# Patient Record
Sex: FEMALE | Race: OTHER | HISPANIC OR LATINO
[De-identification: names, ages, dates, MRNs, and addresses within clinical notes are randomized per-mention and may not be internally consistent; named-entity substitution may affect disease eponyms.]

---

## 2020-09-18 ENCOUNTER — TRANSCRIPTION ENCOUNTER (OUTPATIENT)
Age: 60
End: 2020-09-18

## 2020-10-06 ENCOUNTER — TRANSCRIPTION ENCOUNTER (OUTPATIENT)
Age: 60
End: 2020-10-06

## 2022-11-07 ENCOUNTER — NON-APPOINTMENT (OUTPATIENT)
Age: 62
End: 2022-11-07

## 2023-02-11 ENCOUNTER — NON-APPOINTMENT (OUTPATIENT)
Age: 63
End: 2023-02-11

## 2023-06-10 ENCOUNTER — APPOINTMENT (OUTPATIENT)
Dept: PODIATRY | Facility: CLINIC | Age: 63
End: 2023-06-10

## 2023-08-29 ENCOUNTER — APPOINTMENT (OUTPATIENT)
Dept: PODIATRY | Facility: CLINIC | Age: 63
End: 2023-08-29
Payer: COMMERCIAL

## 2023-08-29 DIAGNOSIS — S90.212A CONTUSION OF LEFT GREAT TOE WITH DAMAGE TO NAIL, INITIAL ENCOUNTER: ICD-10-CM

## 2023-08-29 PROCEDURE — 99203 OFFICE O/P NEW LOW 30 MIN: CPT

## 2023-09-05 PROBLEM — S90.212A CONTUSION OF LEFT GREAT TOE WITH DAMAGE TO NAIL, INITIAL ENCOUNTER: Status: ACTIVE | Noted: 2023-08-31

## 2023-09-05 NOTE — HISTORY OF PRESENT ILLNESS
[FreeTextEntry1] : Patient presents today with 2 problems.  She states that she has had a left hallux, lateral nail purple discoloration since February.  She is not sure if she had bumped her foot but the discoloration has not changed since she first noticed it.  She is not using any nail polish currently.  Denies similar lesions on other nails.  Patient denies any pain. She also left dorsal ankle soft tissue nodule in the skin that feels hard.  It is not pain and it has not changed in appearance.  It has been present for approximately 3 years.  She has seen a dermatologist in the past and had a biopsy performed and was told that it is benign.  Patient is interested in treatment options for this.

## 2023-09-05 NOTE — ASSESSMENT
[FreeTextEntry1] : Impression: Left hallux subungual hematoma (S90.212A).  Left ankle soft tissue mass (M79.89) likely dermatofibroma.    Treatment: Discussed etiology and treatment options.  Regarding the left soft tissue mass, I advised patient to contact her prior dermatologist (she is not who it was) to get the report.  Can perform excision of the lesion; however, if it is benign and does not cause any problems in shoe gear and physical activities, can continue to monitor it.  With any changes or increase in size, would consider resection at that time.   Regarding subungual hematoma, left, will continue to monitor as the nail grows out.  Nails grow out slowly.  Advised patient to wear roomy, well-fitted shoe gear to decrease pressure on the toe.   Return: 3 - 4 months for monitoring of skin mass and hematoma.

## 2023-09-05 NOTE — PHYSICAL EXAM
[2+] : left foot dorsalis pedis 2+ [No Focal Deficits] : no focal deficits [Ankle Swelling (On Exam)] : not present [Varicose Veins Of Lower Extremities] : not present [FreeTextEntry3] : CFT: 3 seconds x 10.  Temperature gradient: normal. [de-identified] : ROM of all pedal joints intact, non-painful, non-crepitant.  No joint effusions or erythema.   [FreeTextEntry1] : Light touch intact.

## 2023-12-26 ENCOUNTER — APPOINTMENT (OUTPATIENT)
Dept: PODIATRY | Facility: CLINIC | Age: 63
End: 2023-12-26
Payer: COMMERCIAL

## 2023-12-26 DIAGNOSIS — S90.229A CONTUSION OF UNSPECIFIED LESSER TOE(S) WITH DAMAGE TO NAIL, INITIAL ENCOUNTER: ICD-10-CM

## 2023-12-26 DIAGNOSIS — M79.89 OTHER SPECIFIED SOFT TISSUE DISORDERS: ICD-10-CM

## 2023-12-26 DIAGNOSIS — S90.212D: ICD-10-CM

## 2023-12-26 PROCEDURE — 99212 OFFICE O/P EST SF 10 MIN: CPT

## 2023-12-27 PROBLEM — M79.89 MASS OF SOFT TISSUE OF ANKLE: Status: ACTIVE | Noted: 2023-08-31

## 2024-01-02 PROBLEM — S90.229A SUBUNGUAL HEMATOMA OF TOENAIL: Status: ACTIVE | Noted: 2023-12-27

## 2024-01-02 PROBLEM — S90.212D: Status: ACTIVE | Noted: 2023-12-27

## 2024-01-02 NOTE — HISTORY OF PRESENT ILLNESS
[Sneakers] : alexandra [FreeTextEntry1] : Patient returns today for follow-up of 2 problems. Patient has left hallux lateral nail discoloration that has been growing out since the last appointment. She denies any recurrence of the lesion and has been slowly cutting it off and trimming her nail. Patient also returns for left foot skin lesion that she has had biopsied by a dermatologist prior but was unable to find the name of the dermatologist. She states that the lesion has not been bothersome. It has not increased in size. She denies any drainage, ulceration or bleeding. Denies any difficulty with shoe gear. Patient denies any medical changes.

## 2024-01-02 NOTE — PHYSICAL EXAM
[2+] : left foot dorsalis pedis 2+ [No Focal Deficits] : no focal deficits [Ankle Swelling (On Exam)] : not present [Varicose Veins Of Lower Extremities] : not present [FreeTextEntry3] : CFT: 3 seconds x10. Temperature gradient: normal. [de-identified] : ROM of all pedal joints intact, non-painful, non-crepitant. No joint effusions or erythema. [FreeTextEntry1] : Light touch intact.

## 2024-01-02 NOTE — ASSESSMENT
[FreeTextEntry1] : Impression: Left hallux subungual hematoma. Left ankle soft tissue mass, likely dermatofibroma.  Treatment: Ther left hallucal nail was trimmed to hygienic length with subungual hematoma cut off. There is no residual discoloration present. No clinical suspicion for melanoma. No indication for biopsy at this time.  Regarding left foot soft tissue mass. It has not changed in size or appearance, less likely to be malignant. Patient states that she would like to have the soft tissue mass resected likely in the summer.  I discussed the procedure, anesthesia and expectations with the patient. She will consider her options and go back when she is ready for resection. Will see patient back as needed.

## 2024-07-24 ENCOUNTER — APPOINTMENT (OUTPATIENT)
Dept: PODIATRY | Facility: CLINIC | Age: 64
End: 2024-07-24

## 2024-07-24 DIAGNOSIS — M79.89 OTHER SPECIFIED SOFT TISSUE DISORDERS: ICD-10-CM

## 2024-07-24 DIAGNOSIS — S90.112S: ICD-10-CM

## 2024-07-24 PROCEDURE — 99213 OFFICE O/P EST LOW 20 MIN: CPT

## 2024-07-30 PROBLEM — S90.112S: Status: ACTIVE | Noted: 2024-07-30

## 2024-07-30 NOTE — ASSESSMENT
[FreeTextEntry1] : Impression: Left hallux subungual hematoma (S90.212A).  Left ankle soft tissue mass (M79.89) likely dermatofibroma.  Contusion, left hallux, no nail damage (S90.112S).  Treatment: Since left hallux, subungual discoloration, suspect hematoma has recurred, there is a risk for melanoma.  Advised patient to schedule an appointment for nail biopsy.  Discussed the procedure, risks, recovery and alternatives.   Patient is [id number] in left ankle, soft tissue mass removal.  Will perform in the office.  Discussed the procedure, recovery, risks, benefits and alternatives.  Patient is to schedule an appointment in 2 weeks for procedure when she can take a few days off work.  Advised patient to bring a sandal.   Return: 2 weeks.

## 2024-07-30 NOTE — REASON FOR VISIT
[Follow-Up Visit] : a follow-up visit for [Other:___] : [unfilled] [FreeTextEntry2] : nail discoloration, skin lesion

## 2024-07-30 NOTE — PHYSICAL EXAM
[2+] : left foot dorsalis pedis 2+ [No Focal Deficits] : no focal deficits [Ankle Swelling (On Exam)] : not present [Varicose Veins Of Lower Extremities] : not present [FreeTextEntry3] : CFT: 3 seconds x 10.  Temperature gradient: normal. [de-identified] : ROM of all pedal joints intact, non-painful, non-crepitant.  No joint effusions or erythema.  No gross deformities present.   [FreeTextEntry1] : Light touch intact.

## 2024-07-30 NOTE — HISTORY OF PRESENT ILLNESS
[FreeTextEntry1] : Patient presents today with a left hallux nail darkening and discoloration at the lateral aspect as well as left anterior ankle/foot skin lesion that patient has had since last year.  The left hallux discoloration has been present since last August and had grown out and resolved by the Fall 2023.  Patient again noticed that same lateral toenail discoloration triangular in shape starting in April 2024.  Denies any trauma to the nail, any new physical activities, changes in shoe gear or wearing tight shoes.   Patient also complains of left foot/ankle skin lesion, which has been present for several years.  She has noticed that it has become harder over the past few months and more bothersome in shoe gear.  Patient is interested in resection.  Denies any drainage, redness, swelling or bleeding.  The lesion was previously biopsied by her dermatologist; however, she is unable to find the name of the dermatologist or the biopsy results.   Denies any new medical changes.

## 2024-07-30 NOTE — PHYSICAL EXAM
[2+] : left foot dorsalis pedis 2+ [No Focal Deficits] : no focal deficits [Ankle Swelling (On Exam)] : not present [Varicose Veins Of Lower Extremities] : not present [FreeTextEntry3] : CFT: 3 seconds x 10.  Temperature gradient: normal. [de-identified] : ROM of all pedal joints intact, non-painful, non-crepitant.  No joint effusions or erythema.  No gross deformities present.   [FreeTextEntry1] : Light touch intact.

## 2024-08-09 ENCOUNTER — APPOINTMENT (OUTPATIENT)
Dept: PODIATRY | Facility: CLINIC | Age: 64
End: 2024-08-09

## 2025-08-13 ENCOUNTER — APPOINTMENT (OUTPATIENT)
Dept: PODIATRY | Facility: CLINIC | Age: 65
End: 2025-08-13

## 2025-08-13 DIAGNOSIS — M79.89 OTHER SPECIFIED SOFT TISSUE DISORDERS: ICD-10-CM

## 2025-08-13 DIAGNOSIS — B35.1 TINEA UNGUIUM: ICD-10-CM

## 2025-08-13 DIAGNOSIS — M79.675 PAIN IN RIGHT TOE(S): ICD-10-CM

## 2025-08-13 DIAGNOSIS — L60.0 INGROWING NAIL: ICD-10-CM

## 2025-08-13 DIAGNOSIS — M79.674 PAIN IN RIGHT TOE(S): ICD-10-CM

## 2025-08-13 DIAGNOSIS — D23.72 OTHER BENIGN NEOPLASM OF SKIN OF LEFT LOWER LIMB, INCLUDING HIP: ICD-10-CM

## 2025-08-13 PROCEDURE — 99213 OFFICE O/P EST LOW 20 MIN: CPT | Mod: 25

## 2025-08-13 PROCEDURE — 11720 DEBRIDE NAIL 1-5: CPT

## 2025-08-13 RX ORDER — KETOCONAZOLE 20 MG/G
2 CREAM TOPICAL TWICE DAILY
Qty: 1 | Refills: 3 | Status: ACTIVE | COMMUNITY
Start: 2025-08-13 | End: 1900-01-01

## 2025-08-19 PROBLEM — M79.674 PAIN IN TOES OF BOTH FEET: Status: ACTIVE | Noted: 2025-08-18

## 2025-08-19 PROBLEM — L60.0 INGROWN NAIL: Status: ACTIVE | Noted: 2025-08-18

## 2025-08-19 PROBLEM — D23.72: Status: ACTIVE | Noted: 2025-08-18
